# Patient Record
Sex: MALE | Race: WHITE | NOT HISPANIC OR LATINO | URBAN - METROPOLITAN AREA
[De-identification: names, ages, dates, MRNs, and addresses within clinical notes are randomized per-mention and may not be internally consistent; named-entity substitution may affect disease eponyms.]

---

## 2021-06-21 ENCOUNTER — OFFICE VISIT (OUTPATIENT)
Dept: URBAN - METROPOLITAN AREA CLINIC 109 | Facility: CLINIC | Age: 51
End: 2021-06-21
Payer: MEDICARE

## 2021-06-21 ENCOUNTER — LAB OUTSIDE AN ENCOUNTER (OUTPATIENT)
Dept: URBAN - METROPOLITAN AREA CLINIC 109 | Facility: CLINIC | Age: 51
End: 2021-06-21

## 2021-06-21 ENCOUNTER — WEB ENCOUNTER (OUTPATIENT)
Dept: URBAN - METROPOLITAN AREA CLINIC 109 | Facility: CLINIC | Age: 51
End: 2021-06-21

## 2021-06-21 DIAGNOSIS — F32.5 MAJOR DEPRESSIVE DISORDER IN FULL REMISSION, UNSPECIFIED WHETHER RECURRENT: ICD-10-CM

## 2021-06-21 DIAGNOSIS — L40.50 PSORIATIC ARTHRITIS: ICD-10-CM

## 2021-06-21 DIAGNOSIS — K50.018 CROHN'S DISEASE OF SMALL INTESTINE WITH OTHER COMPLICATION: ICD-10-CM

## 2021-06-21 DIAGNOSIS — F41.9 ANXIETY: ICD-10-CM

## 2021-06-21 DIAGNOSIS — G40.909 SEIZURE DISORDER: ICD-10-CM

## 2021-06-21 PROBLEM — 128613002: Status: ACTIVE | Noted: 2021-06-21

## 2021-06-21 PROBLEM — 63412003: Status: ACTIVE | Noted: 2021-06-21

## 2021-06-21 PROBLEM — 156370009: Status: ACTIVE | Noted: 2021-06-21

## 2021-06-21 PROBLEM — 48694002: Status: ACTIVE | Noted: 2021-06-21

## 2021-06-21 PROCEDURE — 99204 OFFICE O/P NEW MOD 45 MIN: CPT | Performed by: INTERNAL MEDICINE

## 2021-06-21 NOTE — HPI-TODAY'S VISIT:
The patient returns for care of Crohn's disease.  He was last seen in the practice in 2010 by Dr. 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  He had partial gastric resection at at the time of this visit was on prednisone 40mg daily and azathioprine 50mg daily. The patient reports that the stomach anatomy was thought to be "bizarre" by the GI, Dr. Gunnar Dougherty, who saw him previeous to Dr. Palmer. He is a current smoker, 6 cigarettes a day. His care giver brought him. He lives in group home Mostly he has diarrhea and had some weight loss, but has gained weight since moviing to the group home. The patient has been on methadone for many years and many medications to treat anxiety.  Currently, he has been on no medications for Crohn's for the past decade.  He has intermittent diarrhea 3-4 days out of a 2 week period. Some bleeding is noted.  Diarrhea last for about 4 hours typically.  He has mild to moderate pain in the abdomen. No skin rashes, mouth ulcers or eye disease.  He has a history or psoriatic arthritis. He is under pain management by his PCP who prescribes the methadone.  PMH notable for Hx CVA, hearing voices post CVA, diagnosed with major depression and psychosis.

## 2021-06-22 PROBLEM — 56689002: Status: ACTIVE | Noted: 2021-06-21

## 2021-07-01 ENCOUNTER — LAB OUTSIDE AN ENCOUNTER (OUTPATIENT)
Dept: URBAN - METROPOLITAN AREA CLINIC 109 | Facility: CLINIC | Age: 51
End: 2021-07-01

## 2021-07-15 ENCOUNTER — TELEPHONE ENCOUNTER (OUTPATIENT)
Dept: URBAN - METROPOLITAN AREA CLINIC 92 | Facility: CLINIC | Age: 51
End: 2021-07-15

## 2021-08-13 ENCOUNTER — TELEPHONE ENCOUNTER (OUTPATIENT)
Dept: URBAN - METROPOLITAN AREA CLINIC 94 | Facility: CLINIC | Age: 51
End: 2021-08-13

## 2021-08-24 LAB
A/G RATIO: 1.8
ALBUMIN: 3.6
ALKALINE PHOSPHATASE: 99
ALT (SGPT): 21
AST (SGOT): 23
BASO (ABSOLUTE): 0
BASOS: 0
BILIRUBIN, TOTAL: <0.2
BUN/CREATININE RATIO: 18
BUN: 16
C-REACTIVE PROTEIN, QUANT: 8
CALCIUM: 8.8
CARBON DIOXIDE, TOTAL: 27
CHLORIDE: 103
CREATININE: 0.88
EGFR IF AFRICN AM: 114
EGFR IF NONAFRICN AM: 99
EOS (ABSOLUTE): 0.4
EOS: 6
GLOBULIN, TOTAL: 2
GLUCOSE: 111
HBSAG SCREEN: NEGATIVE
HEMATOCRIT: 39.4
HEMATOLOGY COMMENTS:: (no result)
HEMOGLOBIN: 12.3
HEP B CORE AB, TOT: NEGATIVE
HEP C VIRUS AB: 0.4
IMMATURE CELLS: (no result)
IMMATURE GRANS (ABS): 0
IMMATURE GRANULOCYTES: 0
LYMPHS (ABSOLUTE): 1.4
LYMPHS: 22
MCH: 25.7
MCHC: 31.2
MCV: 82
MONOCYTES(ABSOLUTE): 0.6
MONOCYTES: 10
NEUTROPHILS (ABSOLUTE): 3.8
NEUTROPHILS: 62
NRBC: (no result)
PLATELETS: 379
POTASSIUM: 4.1
PROTEIN, TOTAL: 5.6
QUANTIFERON CRITERIA: (no result)
QUANTIFERON INCUBATION: (no result)
QUANTIFERON MITOGEN VALUE: >10
QUANTIFERON NIL VALUE: 0.01
QUANTIFERON TB1 AG VALUE: 0.03
QUANTIFERON TB2 AG VALUE: 0.02
QUANTIFERON-TB GOLD PLUS: NEGATIVE
RBC: 4.79
RDW: 14
SODIUM: 141
WBC: 6.2

## 2021-08-25 ENCOUNTER — TELEPHONE ENCOUNTER (OUTPATIENT)
Dept: URBAN - METROPOLITAN AREA CLINIC 92 | Facility: CLINIC | Age: 51
End: 2021-08-25

## 2021-09-14 ENCOUNTER — OFFICE VISIT (OUTPATIENT)
Dept: URBAN - METROPOLITAN AREA MEDICAL CENTER 41 | Facility: MEDICAL CENTER | Age: 51
End: 2021-09-14

## 2022-01-05 ENCOUNTER — HOSPITAL ENCOUNTER (EMERGENCY)
Dept: HOSPITAL 5 - ED | Age: 52
Discharge: LEFT BEFORE DISCHARGE | End: 2022-01-05
Payer: MEDICARE

## 2022-01-05 DIAGNOSIS — R19.7: Primary | ICD-10-CM

## 2022-01-05 DIAGNOSIS — Z53.21: ICD-10-CM

## 2022-02-24 ENCOUNTER — HOSPITAL ENCOUNTER (EMERGENCY)
Dept: HOSPITAL 5 - ED | Age: 52
LOS: 1 days | Discharge: HOME | End: 2022-02-25
Payer: MEDICARE

## 2022-02-24 DIAGNOSIS — R41.82: Primary | ICD-10-CM

## 2022-02-24 DIAGNOSIS — Z20.822: ICD-10-CM

## 2022-02-24 LAB
ALBUMIN SERPL-MCNC: 3.9 G/DL (ref 3.9–5)
ALT SERPL-CCNC: 10 UNITS/L (ref 7–56)
BASOPHILS # (AUTO): 0.2 K/MM3 (ref 0–0.1)
BASOPHILS NFR BLD AUTO: 1.9 % (ref 0–1.8)
BUN SERPL-MCNC: 18 MG/DL (ref 9–20)
BUN/CREAT SERPL: 30 %
CALCIUM SERPL-MCNC: 8.9 MG/DL (ref 8.4–10.2)
EOSINOPHIL # BLD AUTO: 0.2 K/MM3 (ref 0–0.4)
EOSINOPHIL NFR BLD AUTO: 2.2 % (ref 0–4.3)
HCT VFR BLD CALC: 34.7 % (ref 35.5–45.6)
HEMOLYSIS INDEX: 13
HGB BLD-MCNC: 12 GM/DL (ref 11.8–15.2)
LYMPHOCYTES # BLD AUTO: 1.3 K/MM3 (ref 1.2–5.4)
LYMPHOCYTES NFR BLD AUTO: 15.1 % (ref 13.4–35)
MCHC RBC AUTO-ENTMCNC: 34 % (ref 32–34)
MCV RBC AUTO: 90 FL (ref 84–94)
MONOCYTES # (AUTO): 0.6 K/MM3 (ref 0–0.8)
MONOCYTES % (AUTO): 6.3 % (ref 0–7.3)
PLATELET # BLD: 424 K/MM3 (ref 140–440)
RBC # BLD AUTO: 3.84 M/MM3 (ref 3.65–5.03)

## 2022-02-24 PROCEDURE — 36415 COLL VENOUS BLD VENIPUNCTURE: CPT

## 2022-02-24 PROCEDURE — 84484 ASSAY OF TROPONIN QUANT: CPT

## 2022-02-24 PROCEDURE — G0480 DRUG TEST DEF 1-7 CLASSES: HCPCS

## 2022-02-24 PROCEDURE — 80307 DRUG TEST PRSMV CHEM ANLYZR: CPT

## 2022-02-24 PROCEDURE — 81001 URINALYSIS AUTO W/SCOPE: CPT

## 2022-02-24 PROCEDURE — 70450 CT HEAD/BRAIN W/O DYE: CPT

## 2022-02-24 PROCEDURE — 80053 COMPREHEN METABOLIC PANEL: CPT

## 2022-02-24 PROCEDURE — U0003 INFECTIOUS AGENT DETECTION BY NUCLEIC ACID (DNA OR RNA); SEVERE ACUTE RESPIRATORY SYNDROME CORONAVIRUS 2 (SARS-COV-2) (CORONAVIRUS DISEASE [COVID-19]), AMPLIFIED PROBE TECHNIQUE, MAKING USE OF HIGH THROUGHPUT TECHNOLOGIES AS DESCRIBED BY CMS-2020-01-R: HCPCS

## 2022-02-24 PROCEDURE — 80320 DRUG SCREEN QUANTALCOHOLS: CPT

## 2022-02-24 PROCEDURE — 71045 X-RAY EXAM CHEST 1 VIEW: CPT

## 2022-02-24 PROCEDURE — 85025 COMPLETE CBC W/AUTO DIFF WBC: CPT

## 2022-02-24 PROCEDURE — 99284 EMERGENCY DEPT VISIT MOD MDM: CPT

## 2022-02-24 NOTE — XRAY REPORT
. CHEST 1 VIEW



INDICATION: 

chest pain.



COMPARISON: 

None.



FINDINGS:



Support devices: None.

Heart: Normal. 

Lungs/Pleura: Chronic interstitial type changes are seen in the right upper lung with associated volu
me loss. There is also mild scarring at the left lung apex. The lungs are mildly hyperinflated. No co
nsolidation, effusion, or pneumothorax.  







IMPRESSION:

1. No acute findings.



Signer Name: Gregory Roper MD 

Signed: 2/24/2022 3:52 PM

Workstation Name: [id number]

## 2022-02-24 NOTE — EVENT NOTE
ED Screening Note


Date of service: 02/24/22


Time: 15:11


ED Screening Note: 





52-year-old male patient presents to the emergency department with 

landlord/caregiver who states that patient has been acting very different this 

morning she states that patient has been very forgetful, and doing things like 

putting objects into the microwave that were not supposed to be there, seeing 

things and people that were not there.  In talking to people who are not there. 

R states the patient has never had this type of thing happen before.  Patient on

assessment noted to be awake alert oriented x3, he is able to state that he has 

been acting weird today.  No unilateral weakness noted, no pronator drift noted,

no facial droop noted, no changes in gait noted.








This initial assessment/diagnostic orders/clinical plan/treatment(s) is/are 

subject to change based on patients health status, clinical progression and re-

assessment by fellow clinical providers in the ED. Further treatment and workup 

at subsequent clinical providers discretion. Patient/guardian urged not to elope

from the ED as their condition may be serious if not clinically assessed and 

managed. 





Initial orders include:

## 2022-02-24 NOTE — EMERGENCY DEPARTMENT REPORT
<DERICK WYNN - Last Filed: 22 17:48>





ED Psych HPI





- General


Chief Complaint: Neuro Symptoms/Deficit


Stated Complaint: ACTING DIFFERENT


Time Seen by Provider: 22 15:33





- Related Data


                                Home Medications











 Medication  Instructions  Recorded  Confirmed  Last Taken


 


Docusate Sodium [Dok] 100 mg PO QDAY 22 Unknown


 


Ergocalciferol [Vitamin D2] 1 cap PO QWEEK 22 Unknown


 


Famotidine [Pepcid] 10 mg PO QDAY 22 Unknown


 


Phenytoin [Dilantin] 100 mg PO BID 22 Unknown


 


QUEtiapine [SEROquel] 100 mg PO QDAY 22 Unknown


 


Sertraline [Zoloft] 100 mg PO HS 22 Unknown


 


Venlafaxine [Effexor] 75 mg PO TID 22 Unknown


 


cephALEXin [Keflex] 500 mg PO Q12HR 22 Unknown


 


metHOTREXate sodium [Methotrexate] 10 mg PO QWEEK 22 Unknown


 


traZODone [Desyrel] 100 mg PO QHS 22 Unknown











                                    Allergies











Allergy/AdvReac Type Severity Reaction Status Date / Time


 


No Known Allergies Allergy   Verified 22 14:41














ED Past Medical Hx





- Medications


Home Medications: 


                                Home Medications











 Medication  Instructions  Recorded  Confirmed  Last Taken  Type


 


Docusate Sodium [Dok] 100 mg PO QDAY 22 Unknown History


 


Ergocalciferol [Vitamin D2] 1 cap PO QWEEK 22 Unknown History


 


Famotidine [Pepcid] 10 mg PO QDAY 22 Unknown History


 


Phenytoin [Dilantin] 100 mg PO BID 22 Unknown History


 


QUEtiapine [SEROquel] 100 mg PO QDAY 22 Unknown History


 


Sertraline [Zoloft] 100 mg PO HS 22 Unknown History


 


Venlafaxine [Effexor] 75 mg PO TID 22 Unknown History


 


cephALEXin [Keflex] 500 mg PO Q12HR 22 Unknown History


 


metHOTREXate sodium [Methotrexate] 10 mg PO QWEEK 22 Unknown 

History


 


traZODone [Desyrel] 100 mg PO QHS 22 Unknown History














ED Medical Decision Making





- Lab Data


Result diagrams: 


                                 22 15:48





                                 22 15:48





ED Disposition


Clinical Impression: 


 Altered mental status





Disposition: 01 HOME / SELF CARE / HOMELESS


Is pt being admited?: No


Does the pt Need Aspirin: No


Condition: Stable


Additional Instructions: 


                       OUTPATIENT MENTAL HEALTH RESOURCES





[facility]         Jessica Torres MD:


[address],                [address]


[place] [address]               [place] [address]


 [phone]





[facility]:              [address]               [address] [place] [address]            [place] [address]


[phone]





[facility]:      [facility]: 


[address]  [address]      [address]


[place] [address]               [facility] [id number] [phone]





[facility]:     Angel Koch MD:


[address]                 [address] [id number] [phone]





[facility]:   


[address] [place], [place] [id number] [phone]


                  **[place] CRISIS AND ACCESS LINE: [phone]**





In case of an emergency, please contact the following numbers:











[place] Crisis and Access Line: Number: 8-692-012-0058


 


Crisis Text Line: (Text START) Number: 099104


 


Suicide Prevention Line: Number: 8-284-287-8405


 


Emergency Number: 911








SUBSTANCE ABUSE PROGRAMS:





[facility]:


Location: [place], [place]


Phone: [phone]


[facility]


Address: [address] [place] [address]


Phone: [phone]





[facility]:





Address: [address], [place] [address]


Phone: [phone]





[facility]:


 


Address: [address] [place] [address]


Phone: [phone]








[facility]:


Address: [address] [place] [address]


Phone: [phone]





[facility]





Address: [address] [place] [address].


Phone: [phone]


Please contact above numbers to attempt placement into free based program.


Medicaid Programs: 





[facility]:


Address: [address] [place] [address]


Phone: [phone]





[facility]:


Address: [address] [place] [address]


Phone: [phone]








Referrals: 


ROMI OSWALD MD [Primary Care Provider] - 3-5 Days





<MAC DEL RIO - Last Filed: 22 22:52>





ED Psych HPI





- General


Source: patient, family


Mode of arrival: Ambulatory





- History of Present Illness


Initial Comments: 





Patient is 52 years old male with history of Crohn's disease.  Unknown past 

psychiatric history.  Patient brought to the emergency room by his caregiver or 

landlord for evaluation of strange behavior.  Landlord stated that patient 

became more forgetful and acting not right.  She mentioned that he is putting 

stuff that should not be in a microwave in the microwave.  Patient is alert, 

oriented x3 in no acute distress.  Patient is jumping from one statement to 

another statement.  He is not focus.  He denies suicidal or homicidal ideation. 

He also denied hearing voices or seeing things that are not there.  Patient 

stated that he started on medication yesterday and he believes this is what is 

causing his problem but he does not remember the name of the medication.


MD Complaint: altered mental status





ED Review of Systems


ROS: 


Stated complaint: ACTING DIFFERENT


Other details as noted in HPI





Comment: All other systems reviewed and negative


Constitutional: denies: chills, fever


Respiratory: denies: cough, shortness of breath, SOB with exertion


Cardiovascular: denies: chest pain, palpitations


Gastrointestinal: denies: abdominal pain, nausea, vomiting


Musculoskeletal: denies: back pain


Neurological: denies: headache, weakness


Psychiatric: denies: auditory hallucinations, visual hallucinations, homicidal 

thoughts, suicidal thoughts





ED Physical Exam





- General


Limitations: No Limitations


General appearance: alert, in no apparent distress





- Head


Head exam: Present: atraumatic, normocephalic, normal inspection





- Eye


Eye exam: Present: normal appearance





- ENT


ENT exam: Present: normal exam, normal orophraynx, mucous membranes moist





- Neck


Neck exam: Present: normal inspection, full ROM.  Absent: tenderness, 

meningismus





- Respiratory


Respiratory exam: Present: normal lung sounds bilaterally





- Cardiovascular


Cardiovascular Exam: Present: regular rate, normal rhythm, normal heart sounds





- GI/Abdominal


GI/Abdominal exam: Present: soft, normal bowel sounds.  Absent: distended, 

tenderness, guarding, rebound, rigid, organomegaly, mass, bruit, pulsatile mass,

hernia





- Extremities Exam


Extremities exam: Present: normal inspection, full ROM, normal capillary refill.

 Absent: tenderness, pedal edema, joint swelling, calf tenderness





- Back Exam


Back exam: Present: normal inspection, full ROM.  Absent: CVA tenderness (R), 

CVA tenderness (L)





- Neurological Exam


Neurological exam: Present: alert, oriented X3, CN II-XII intact





- Psychiatric


Psychiatric exam: Present: normal mood





- Skin


Skin exam: Present: warm, intact, normal color





ED Course


                                   Vital Signs











  22





  14:56 16:23 16:25


 


Temperature 98.7 F  


 


Pulse Rate 106 H 84 


 


Respiratory 18 17 16





Rate   


 


Blood Pressure   


 


Blood Pressure 106/76  





[Right]   


 


O2 Sat by Pulse 96 99 99





Oximetry   














  22





  16:28 16:29 16:31


 


Temperature 98.0 F  


 


Pulse Rate 81 81 74


 


Respiratory 18  22





Rate   


 


Blood Pressure   106/79


 


Blood Pressure 106/74  





[Right]   


 


O2 Sat by Pulse 99  99





Oximetry   














  22





  16:45 17:01 17:15


 


Temperature   


 


Pulse Rate 81 90 77


 


Respiratory 14 14 22





Rate   


 


Blood Pressure 106/79 105/74 105/74


 


Blood Pressure   





[Right]   


 


O2 Sat by Pulse 97 97 97





Oximetry   














  22





  17:31 17:45 18:01


 


Temperature   


 


Pulse Rate   154 H


 


Respiratory   





Rate   


 


Blood Pressure 105/74 105/74 101/63


 


Blood Pressure   





[Right]   


 


O2 Sat by Pulse 97 95 96





Oximetry   














  22





  18:03 19:01 20:01


 


Temperature   


 


Pulse Rate   


 


Respiratory   





Rate   


 


Blood Pressure 101/63 107/67 101/74


 


Blood Pressure   





[Right]   


 


O2 Sat by Pulse 96 96 97





Oximetry   














  22





  21:01 22:01 23:57


 


Temperature   


 


Pulse Rate   


 


Respiratory  12 





Rate   


 


Blood Pressure 94/71 102/73 103/76


 


Blood Pressure   





[Right]   


 


O2 Sat by Pulse 97 97 





Oximetry   














  22





  00:41 01:01 02:01


 


Temperature   


 


Pulse Rate 62 73 71


 


Respiratory 13 19 21





Rate   


 


Blood Pressure 103/76 112/67 89/54


 


Blood Pressure   





[Right]   


 


O2 Sat by Pulse  96 96





Oximetry   














  22





  03:01 04:01 05:01


 


Temperature   


 


Pulse Rate 69 64 63


 


Respiratory 23 19 18





Rate   


 


Blood Pressure 106/50 90/63 90/63


 


Blood Pressure   





[Right]   


 


O2 Sat by Pulse 96 96 97





Oximetry   














  22





  05:17 06:01 07:29


 


Temperature 97.6 F  


 


Pulse Rate 74 78 


 


Respiratory 21 20 





Rate   


 


Blood Pressure 105/74 95/70 95/70


 


Blood Pressure   





[Right]   


 


O2 Sat by Pulse 98 96 81 L





Oximetry   














  22





  08:12 09:00 09:56


 


Temperature   97.9 F


 


Pulse Rate   


 


Respiratory   16





Rate   


 


Blood Pressure 92/61 95/68 


 


Blood Pressure   





[Right]   


 


O2 Sat by Pulse 82 L  98





Oximetry   














  22





  10:00 11:00 12:01


 


Temperature   


 


Pulse Rate 74 80 97 H


 


Respiratory 12 21 21





Rate   


 


Blood Pressure 95/68 105/70 104/76


 


Blood Pressure   





[Right]   


 


O2 Sat by Pulse 88 97 97





Oximetry   














  22





  13:01 14:01 15:01


 


Temperature   


 


Pulse Rate 77 69 64


 


Respiratory 17 22 15





Rate   


 


Blood Pressure 97/65 101/72 101/72


 


Blood Pressure   





[Right]   


 


O2 Sat by Pulse 98 97 97





Oximetry   














  22





  16:01 17:01 18:00


 


Temperature   98.0 F


 


Pulse Rate 57 L 63 


 


Respiratory 23 22 





Rate   


 


Blood Pressure 101/72 101/72 


 


Blood Pressure   





[Right]   


 


O2 Sat by Pulse 99 97 





Oximetry   














ED Medical Decision Making





- Lab Data


Result diagrams: 


                                 22 15:48





                                 22 15:48





- Radiology Data


Radiology results: report reviewed





- Medical Decision Making





Patient is 52 years old male with history of Crohn's disease.  Unknown past 

psychiatric history.  Patient brought to the emergency room by his caregiver or 

landlord for evaluation of strange behavior.  Landlord stated that patient 

became more forgetful and acting not right.  She mentioned that he is putting 

stuff that should not be in a microwave in the microwave.  Patient is alert, 

oriented x3 in no acute distress.  Patient is jumping from one statement to 

another statement.  He is not focus.  He denies suicidal or homicidal ideation. 

 He also denied hearing voices or seeing things that are not there.  Patient 

stated that he started on medication yesterday and he believes this is what is 

causing his problem but he does not remember the name of the medication.





Patient remained stable in the ER with a stable vital sign.  CT brain is 

negative for acute finding.  Chest x-ray is unremarkable.  Labs reviewed and is 

unremarkable.  Patient is medically cleared to be evaluated by psychiatric team.


Critical care attestation.: 


If time is entered above; I have spent that time in minutes in the direct care 

of this critically ill patient, excluding procedure time.








ED Disposition


Is pt being admited?: No
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
## 2022-02-25 VITALS — DIASTOLIC BLOOD PRESSURE: 72 MMHG | SYSTOLIC BLOOD PRESSURE: 101 MMHG

## 2022-02-25 LAB
BENZODIAZEPINES SCREEN,URINE: (no result)
METHADONE SCREEN,URINE: (no result)
OPIATE SCREEN,URINE: (no result)
PH UR STRIP: 7 [PH] (ref 5–7)
PROT UR STRIP-MCNC: (no result) MG/DL
RBC #/AREA URNS HPF: 1 /HPF (ref 0–6)
UROBILINOGEN UR-MCNC: < 2 MG/DL (ref ?–2)
WBC #/AREA URNS HPF: < 1 /HPF (ref 0–6)

## 2022-02-25 NOTE — EMERGENCY DEPARTMENT REPORT
Blank Doc





- Documentation


Documentation: 





Patient has no complaints this morning.  He is not suicidal or homicidal.  He is

awaiting psychiatric evaluation and disposition.  There were no events 

throughout the course of the evening.  He has been eating well.

## 2022-12-08 ENCOUNTER — OFFICE VISIT (OUTPATIENT)
Dept: URBAN - METROPOLITAN AREA CLINIC 92 | Facility: CLINIC | Age: 52
End: 2022-12-08

## 2022-12-09 ENCOUNTER — OFFICE VISIT (OUTPATIENT)
Dept: URBAN - METROPOLITAN AREA CLINIC 98 | Facility: CLINIC | Age: 52
End: 2022-12-09
Payer: MEDICARE

## 2022-12-09 ENCOUNTER — DASHBOARD ENCOUNTERS (OUTPATIENT)
Age: 52
End: 2022-12-09

## 2022-12-09 ENCOUNTER — LAB OUTSIDE AN ENCOUNTER (OUTPATIENT)
Dept: URBAN - METROPOLITAN AREA CLINIC 98 | Facility: CLINIC | Age: 52
End: 2022-12-09

## 2022-12-09 VITALS — HEART RATE: 75 BPM | TEMPERATURE: 98.6 F | SYSTOLIC BLOOD PRESSURE: 138 MMHG | DIASTOLIC BLOOD PRESSURE: 96 MMHG

## 2022-12-09 DIAGNOSIS — K59.01 CONSTIPATION BY DELAYED COLONIC TRANSIT: ICD-10-CM

## 2022-12-09 DIAGNOSIS — K50.80 CROHN'S COLITIS: ICD-10-CM

## 2022-12-09 PROCEDURE — 99214 OFFICE O/P EST MOD 30 MIN: CPT | Performed by: INTERNAL MEDICINE

## 2022-12-09 PROCEDURE — 99244 OFF/OP CNSLTJ NEW/EST MOD 40: CPT | Performed by: INTERNAL MEDICINE

## 2022-12-09 NOTE — PHYSICAL EXAM CONSTITUTIONAL:
well developed, well nourished , in no acute distress , ambulating without difficulty , normal communication ability [FreeTextEntry1] : Quality: PCOS\par Severity: moderate\par Duration: over 3 years\par Onset: irregulare menses, weight gain\par Modifying Factors: Better with medication \par Associated Symptoms: weight gain, thinning hair\par \par Notes:\par LMP 2/16/19 - menses regular every 35-40 days, no pregnancy history\par exercise: none, will start walking in the Spring \par \par Current Regimen:\par Metformin  mg daily\par \par \par Labs reviewed: No recent labs\par

## 2022-12-09 NOTE — HPI-TODAY'S VISIT:
Patient referred by Dr. Lorraine Toledo and note olivia be sent over Diagnosed to have Crohn's since 1997 Had a small bowel resection in 2003.  Has not seen anyone for crohn's recently Last colon in 2005 More constipation- BM every 7-10 days.  No bleeding.  Lost 13 lbs in the last month. Has had issues with bronchitis/PNA and just getting better.  Been on pain meds for 23 years for crohn's

## 2022-12-12 LAB
A/G RATIO: 0.9
ABSOLUTE BASOPHILS: 40
ABSOLUTE EOSINOPHILS: 0
ABSOLUTE LYMPHOCYTES: 1267
ABSOLUTE MONOCYTES: 693
ABSOLUTE NEUTROPHILS: (no result)
ALBUMIN: 3.4
ALKALINE PHOSPHATASE: 97
ALT (SGPT): 17
AST (SGOT): 22
BASOPHILS: 0.2
BILIRUBIN, TOTAL: 0.2
BUN/CREATININE RATIO: (no result)
BUN: 18
C-REACTIVE PROTEIN, QUANT: 81.6
CALCIUM: 9.5
CARBON DIOXIDE, TOTAL: 31
CHLORIDE: 95
CREATININE: 0.74
EGFR: 108
EOSINOPHILS: 0
FERRITIN, SERUM: 77
FOLATE (FOLIC ACID), SERUM: 9.5
GLOBULIN, TOTAL: 3.7
GLUCOSE: 78
HEMATOCRIT: 38.3
HEMOGLOBIN: 12.7
HEPATITIS B SURFACE ANTIBODY QL: (no result)
HEPATITIS B SURFACE ANTIGEN: (no result)
HEPATITIS C ANTIBODY: (no result)
INDEX: 0.27
IRON BIND.CAP.(TIBC): 306
IRON SATURATION: 6
IRON: 19
LYMPHOCYTES: 6.4
MCH: 29.1
MCHC: 33.2
MCV: 87.8
MITOGEN-NIL: 4.34
MONOCYTES: 3.5
MPV: 8.9
NEUTROPHILS: 89.9
NIL: 0.04
PLATELET COUNT: 575
POTASSIUM: 4.9
PROTEIN, TOTAL: 7.1
QUANTIFERON(R)-TB GOLD: NEGATIVE
RDW: 11.7
RED BLOOD CELL COUNT: 4.36
SODIUM: 140
TB1-NIL: <0
TB2-NIL: <0
VITAMIN B12: 610
WHITE BLOOD CELL COUNT: 19.8

## 2022-12-16 PROBLEM — 71833008 CROHN'S DISEASE OF SMALL AND LARGE INTESTINES: Status: ACTIVE | Noted: 2022-12-16

## 2023-01-09 ENCOUNTER — OFFICE VISIT (OUTPATIENT)
Dept: URBAN - METROPOLITAN AREA MEDICAL CENTER 39 | Facility: MEDICAL CENTER | Age: 53
End: 2023-01-09
Payer: MEDICARE

## 2023-01-09 DIAGNOSIS — Z12.11 COLON CANCER SCREENING: ICD-10-CM

## 2023-01-09 PROCEDURE — 992 APS NON BILLABLE: Performed by: INTERNAL MEDICINE

## 2023-02-06 PROBLEM — 426549001: Status: ACTIVE | Noted: 2022-12-09

## 2023-02-06 PROBLEM — 35298007: Status: ACTIVE | Noted: 2022-12-09

## 2023-03-20 ENCOUNTER — OFFICE VISIT (OUTPATIENT)
Dept: URBAN - METROPOLITAN AREA MEDICAL CENTER 39 | Facility: MEDICAL CENTER | Age: 53
End: 2023-03-20

## 2023-06-05 ENCOUNTER — TELEPHONE ENCOUNTER (OUTPATIENT)
Dept: URBAN - METROPOLITAN AREA CLINIC 98 | Facility: CLINIC | Age: 53
End: 2023-06-05

## 2023-06-05 ENCOUNTER — OFFICE VISIT (OUTPATIENT)
Dept: URBAN - METROPOLITAN AREA SURGERY CENTER 18 | Facility: SURGERY CENTER | Age: 53
End: 2023-06-05

## 2024-08-27 ENCOUNTER — CLAIMS CREATED FROM THE CLAIM WINDOW (OUTPATIENT)
Dept: URBAN - METROPOLITAN AREA MEDICAL CENTER 33 | Facility: MEDICAL CENTER | Age: 54
End: 2024-08-27

## 2024-08-27 PROCEDURE — 99222 1ST HOSP IP/OBS MODERATE 55: CPT | Performed by: INTERNAL MEDICINE

## 2024-09-10 ENCOUNTER — TELEPHONE ENCOUNTER (OUTPATIENT)
Dept: URBAN - METROPOLITAN AREA CLINIC 105 | Facility: CLINIC | Age: 54
End: 2024-09-10